# Patient Record
Sex: FEMALE | Race: OTHER | HISPANIC OR LATINO | URBAN - METROPOLITAN AREA
[De-identification: names, ages, dates, MRNs, and addresses within clinical notes are randomized per-mention and may not be internally consistent; named-entity substitution may affect disease eponyms.]

---

## 2023-09-15 ENCOUNTER — EMERGENCY (EMERGENCY)
Facility: HOSPITAL | Age: 53
LOS: 1 days | Discharge: HOME | End: 2023-09-15
Attending: EMERGENCY MEDICINE
Payer: COMMERCIAL

## 2023-09-15 VITALS
HEIGHT: 62 IN | RESPIRATION RATE: 16 BRPM | DIASTOLIC BLOOD PRESSURE: 80 MMHG | TEMPERATURE: 98 F | SYSTOLIC BLOOD PRESSURE: 140 MMHG | OXYGEN SATURATION: 99 % | HEART RATE: 82 BPM | WEIGHT: 179.9 LBS

## 2023-09-15 DIAGNOSIS — Z94.0 KIDNEY TRANSPLANT STATUS: Chronic | ICD-10-CM

## 2023-09-15 PROCEDURE — 73130 X-RAY EXAM OF HAND: CPT | Mod: 26,LT

## 2023-09-15 PROCEDURE — 99283 EMERGENCY DEPT VISIT LOW MDM: CPT | Mod: 25

## 2023-09-15 PROCEDURE — 73130 X-RAY EXAM OF HAND: CPT

## 2023-09-15 PROCEDURE — 99284 EMERGENCY DEPT VISIT MOD MDM: CPT

## 2023-09-15 PROCEDURE — 82962 GLUCOSE BLOOD TEST: CPT

## 2023-09-15 NOTE — ED ADULT NURSE NOTE - NSFALLUNIVINTERV_ED_ALL_ED
Bed/Stretcher in lowest position, wheels locked, appropriate side rails in place/Call bell, personal items and telephone in reach/Instruct patient to call for assistance before getting out of bed/chair/stretcher/Non-slip footwear applied when patient is off stretcher/[place] to call system/Physically safe environment - no spills, clutter or unnecessary equipment/Purposeful proactive rounding/Room/bathroom lighting operational, light cord in reach

## 2023-09-15 NOTE — ED PROVIDER NOTE - CARE PROVIDER_API CALL
Jef Conley  Orthopaedic Surgery  [address] [place] [address]  Phone: [phone]  Fax: [phone]  Follow Up Time:

## 2023-09-15 NOTE — ED PROVIDER NOTE - PATIENT PORTAL LINK FT
You can access the FollowMyHealth Patient Portal offered by [facility] by registering at the following website: [email or web address]. By joining [facility]’s FollowMyHealth portal, you will also be able to view your health information using other applications (apps) compatible with our system.

## 2023-09-15 NOTE — ED PROVIDER NOTE - ATTENDING CONTRIBUTION TO CARE
Pain along flexor policus brevus distribution without edema/erythema, normal ROM, plain films without emergent findings, will give information for hand surgery for follow up.

## 2023-09-15 NOTE — ED PROVIDER NOTE - NS ED ROS FT
Constitutional:  (-) fever, (-) chills, (-) lethargy  Eyes:  (-) eye pain (-) visual changes  ENMT: (-) nasal discharge, (-) sore throat. (-) neck pain or stiffness  Cardiac: (-) chest pain (-) palpitations  Respiratory:  (-) cough (-) respiratory distress.   GI:  (-) nausea (-) vomiting (-) diarrhea (-) abdominal pain.  :  (-) dysuria (-) frequency (-) burning.  MS:  (-) back pain (+) joint pain - L fourth digit   Neuro:  (-) headache (-) numbness (-) tingling (-) focal weakness  Skin:  (-) rash  Except as documented in the HPI,  all other systems are negative

## 2023-09-15 NOTE — ED PROVIDER NOTE - NSFOLLOWUPINSTRUCTIONS_ED_ALL_ED_FT
You came to the Emergency Room because of L ring finger pain.     Your symptom is likely due to tendon injury or arthritis.     You may take Tylenol 1000 mg every 8 hours (no more than 4000 mg per 24 hours) as needed for pain.     Please follow up with your Primary Medical Doctor within the next 7 days to monitor your symptoms.     Please come back to the Emergency Room if your symptoms get worse. You came to the Emergency Room because of L ring finger pain.     Your symptom is likely due to tendon injury or arthritis.     You may take Tylenol 1000 mg every 8 hours (no more than 4000 mg per 24 hours) as needed for pain.     Please follow up with the Orthopedic Surgeon in the [facility] within the next 7 days to monitor your symptoms.     Please come back to the Emergency Room if your symptoms get worse.

## 2023-09-15 NOTE — ED PROVIDER NOTE - CLINICAL SUMMARY MEDICAL DECISION MAKING FREE TEXT BOX
53-year-old female, history of hypertension, hyperlipidemia, diabetes, status post renal transplant in 2004 on prednisone, CellCept, tacrolimus, presenting with 2 days onset of atraumatic left foot digit pain.  Denies fever, swelling.  Currently does not have any pain.  Reports that pain is triggered when she is holding certain objects in certain hand position.  No similar experience in the past.  No prior history of arthritis, osteoporosis. Vital signs within normal limits on arrival.  Physical exam as noted above was unremarkable.L 4th digit exam is unremarkable, no swelling, no erythema noted, ROM normal in UE/LE.  Differentials include but not limited to arthritis versus tendinopathy versus less likely fracture.  Will get x-ray to rule out fracture.  Patient is offered pain meds, patient declined.

## 2023-09-15 NOTE — ED PROVIDER NOTE - PHYSICAL EXAMINATION
Gen: Patient is well-appearing, NAD, AAOx3, able to ambulate without assistance  HEENT: NCAT, EOMI, PERRLA, normal conjunctiva, tongue midline, oral mucosa moist  Lung: CTAB, no respiratory distress, no wheezes/rhonchi/rales B/L, speaking in full sentences  CV: RRR, no murmurs, rubs or gallops, distal pulses 2+ b/l  Abd: soft, NT, ND, no guarding, no rigidity, no rebound tenderness  MSK: no visible deformities, L 4th digit exam is unremarkable, no swelling, no erythema noted, ROM normal in UE/LE  Neuro: No focal sensory or motor deficits  Skin: Warm, well perfused, no rash, no leg swelling  Psych: normal affect, calm

## 2023-09-15 NOTE — ED PROVIDER NOTE - OBJECTIVE STATEMENT
53-year-old female, history of hypertension, hyperlipidemia, diabetes, status post renal transplant in 2004 on prednisone, CellCept, tacrolimus, presenting with 2 days onset of atraumatic left foot digit pain.  Denies fever, swelling.  Currently does not have any pain.  Reports that pain is triggered when she is holding certain objects in certain hand position.  No similar experience in the past.  No prior history of arthritis, osteoporosis.

## 2024-04-22 NOTE — ED PROVIDER NOTE - IV ALTEPLASE EXCL REL HIDDEN
Partially impaired: cannot see medication labels or newsprint, but can see obstacles in path, and the surrounding layout; can count fingers at arm's length show